# Patient Record
Sex: MALE | Race: WHITE | Employment: FULL TIME | URBAN - METROPOLITAN AREA
[De-identification: names, ages, dates, MRNs, and addresses within clinical notes are randomized per-mention and may not be internally consistent; named-entity substitution may affect disease eponyms.]

---

## 2017-06-30 NOTE — TELEPHONE ENCOUNTER
Requesting Escitalopram   LOV: 7/14/16  RTC: 1-2m  Last Labs: n/a   Filled: 6/28/16 #90 with 4 refills    No future appointments. Patient overdue for f/u, please contact patient to schedule visit.

## 2017-07-27 NOTE — TELEPHONE ENCOUNTER
Time started: 1006    Time ended: 1009    Total time spent on chart: 3 mins    Refill protocol failed because the patient did not meet the protocol criteria.     Requesting amlodipine/ benzapril 5/20mg  LOV: 7/14/16  RTC: 1-2  months  Last Labs: 7/21/16  Fi

## 2017-07-28 RX ORDER — ESCITALOPRAM OXALATE 20 MG/1
20 TABLET ORAL DAILY
Qty: 90 TABLET | Refills: 1 | OUTPATIENT
Start: 2017-07-28

## 2017-07-31 RX ORDER — AMLODIPINE BESYLATE AND BENAZEPRIL HYDROCHLORIDE 5; 20 MG/1; MG/1
1 CAPSULE ORAL DAILY
Qty: 90 CAPSULE | Refills: 1 | OUTPATIENT
Start: 2017-07-31

## 2020-07-21 ENCOUNTER — HOSPITAL ENCOUNTER (EMERGENCY)
Age: 55
Discharge: STILL ADMITTED OR ADMINISTRATIVE | End: 2020-07-21
Payer: COMMERCIAL